# Patient Record
(demographics unavailable — no encounter records)

---

## 2024-10-25 NOTE — HISTORY OF PRESENT ILLNESS
[FreeTextEntry1] :  BIJU AVENDAÑO 60 year old male presents for an initial evaluation of a right knee injury at work on June 26, 2024. Over the last few months, his knee has caused more pain. He works as a . The pain has not improved since onset. He localizes the pain around the medial aspect. He describes the pain as dull and sharp. He notes no mechanical symptoms. The pain is worse with long periods of standing. He has tried bracing which provided some support. Navigating stairs is difficult. He is currently in physical therapy. He has not had any injection therapy. He has also used ice that did not help. Past medical history of hyperlipidemia. He has had an ACL reconstruction in 1996 and a right shoulder ORIF. No known drug allergies.

## 2024-10-25 NOTE — ADDENDUM
[FreeTextEntry1] : This note was written by Pablo Perdue on 10/25/2024 acting as scribe for Dr. Rob Han M.D.  I, Dr. Rob Han, have read and attest that all the information, medical decision making and discharge instructions within are true and accurate.

## 2024-10-25 NOTE — PHYSICAL EXAM
[de-identified] :  General appearance: well nourished and hydrated, pleasant, alert and oriented x 3, cooperative. HEENT: Normocephalic, EOM intact, Nasal septum midline, Oral cavity clear, External auditory canal clear. Cardiovascular: no apparent abnormalities, no lower leg edema, no varicosities, pedal pulses are palpable. Lymphatics Lymph nodes: none palpated, Lymphedema: not present. Neurologic: sensation is normal, no muscle weakness in upper or lower extremities, patella tendon reflexes intact . Dermatologic no apparent skin lesions, moist, warm, no rash. Spine:cervical spine appears normal and moves freely, thoracic spine appears normal and moves freely, lumbosacral spine appears normal and moves freely. Gait: nonantalgic.   Left knee Inspection: no effusion or erythema. Wounds: healed medial incision. Alignment: normal. Palpation: no specific tenderness on palpation. ROM active (in degrees): 0-120  Ligamentous laxity: all ligaments appear stable,, negative ant. drawer test, negative post. drawer test, stable to varus stress test, stable to valgus stress test. negative Lachman's test, negative pivot shift test Meniscal Test: negative McMurrays, negative Vic. Patellofemoral Alignment Test: Q angle-, normal. Muscle Test: good quad strength. Leg examination: calf is soft and non-tender.   Right knee Inspection: trace or erythema. Wounds: healed midline incision. Alignment: normal. Palpation: medial tenderness on palpation. ROM active (in degrees): 0-100 with pain and crepitus  Ligamentous laxity: all ligaments appear stable,, negative ant. drawer test, negative post. drawer test, stable to varus stress test, stable to valgus stress test. negative Lachman's test, negative pivot shift test Meniscal Test: negative McMurrays, negative Vic. Patellofemoral Alignment Test: Q angle-, normal. Muscle Test: good quad strength. Leg examination: calf is soft and non-tender.   Left hip Inspection: No swelling or ecchymosis. Wounds: none. Palpation: non-tender. Stability: no instability. Strength: 5/5 all motor groups. ROM: no pain with FROM. Leg length: equal.   Right hip Inspection: No swelling or ecchymosis. Wounds: none. Palpation: non-tender. Stability: no instability. Strength: 5/5 all motor groups. ROM: no pain with FROM. Leg length: equal.   Left ankle Inspection: no erythema noted, no swelling noted. Palpation: no pain on palpation . ROM: FROM without crepitus. Muscle strength: 5/5. Stability: no instability noted.   Right ankle Inspection: no erythema noted, no swelling noted. ROM: FROM without crepitus. Palpation: no pain on palpation . Muscle strength: 5/5. Stability: no instability noted.   Left foot Inspection: color, texture and turgor are normal. ROM: full range of motion of all joints without pain or crepitus. Palpation: no tenderness. Stability: no instability noted.   Right foot Inspection: color, texture and turgor are normal. ROM: full range of motion of all joints without pain or crepitus. Palpation: no tenderness. Stability: no instability noted.   Left shoulder Inspection: no muscle asymmetry, no atrophy. Palpation: no tenderness noted, ACJ non-tender. ROM: full active ROM, full passive ROM. Strength testing): anterior deltoid, supraspinatus, infraspinatus, subscapularis all 5/5. Stability test: ant. apprehension negative, post. apprehension negative, relocation test negative. Impingement Test: negative NEER.   Right shoulder Inspection: no muscle asymmetry, no atrophy. Palpation: no tenderness noted, ACJ non-tender. ROM: full active ROM, full passive ROM. Strength testing): anterior deltoid, supraspinatus, infraspinatus, subscapularis all 5/5. Stability test: ant. apprehension negative, post. apprehension negative, relocation test negative. Impingement Test: negative NEER. Surgical Wounds: none.   Left elbow Inspection: negative swelling. Wounds: none. Palpation: non-tender. ROM: full ROM. Strength: 5/5 all groups. Stability: no instability. Mass: none.   Right elbow Inspection: negative swelling. Wounds: none. Palpation: non-tender. ROM: full ROM. Strength: 5/5 all groups. Stability: no instability. Mass: none.   Left wrist Inspection: negative swelling. Wound: none. Palpation (bone): no tenderness. ROM: full ROM. Strength: full , good.   Right wrist Inspection: negative swelling. Wound: none. Palpation (bone): no tenderness. ROM: full ROM. Strength: full , good.   Left hand Inspection: no skin changes, normal appearance. Wounds: none. Strength: full , able to make full fist. Sensation: light touch intact all fingers and thumb. Vascular: good capillary refill < 3 seconds, all fingers and thumb. Mass: none.   Right hand Inspection: no skin changes, normal appearance. Wounds: none. Strength: full , able to make full fist. Sensation: light touch intact all fingers and thumb. Vascular: good capillary refill < 3 seconds, all fingers and thumb. Mass: none. [de-identified] : Right knee x-rays, standing AP/Lateral and Merchant films, and 45-degree PA standing view, taken at the office today shows diffuse tricompartmental degenerative arthritis, evidence of ACL reconstruction, femoral screw noted, medial and lateral joint space narrowing especially medial compartment, marginal osteophytes, bone on bone sclerosis, patellofemoral joint space narrowing, peripheral osteophytes, Kellgren and Marvin grade 4.   Left knee x-ray merchant view taken at the office today demonstrates joint space narrowing, osteophytes, two staples noted in femur, and a well centered patella.

## 2024-10-25 NOTE — DISCUSSION/SUMMARY
[de-identified] : Discussed at length the nature of the patient's condition. Their right knee symptoms appear secondary to degenerative arthritis related to a work injury as noted above. I have discussed operative vs. non-operative treatment plans with the patient as well as the natural history of the condition. He has had a prior ACL reconstruction, so his screws may need to be removed before surgery. He also wishes to transfer care to us and will seek approval from worker's comp. Discussed at length the natural history of degenerative arthritis and reviewed non-operative and operative treatment. Prior non-operative treatment for more than 3 months by either myself or prior treating physicians, including NSAIDs, injection therapy, a structured exercise program or physiotherapy and weight management has not resolved the condition. Due to the pain and associated functional disability that impacts all appropriate activities of daily living, I recommend a RIGHT total knee replacement. A thorough discussion regarding the risks, benefits, convalescence and alternatives were reviewed. The risks can include but are not limited to anesthesia risk, bleeding wit possible transfusion, nerve injury, infection, revision surgery due to implant failure, bone fracture, deep vein thrombosis, cardiac failure, pneumonia, and respiratory distress. The patient's expectations were reviewed and compared to the surgeon's expectations. This allowed for a discussion regarding reasonable expectations for functional improvement, pain relief, and return to normal activities of daily living. Numerous questions were asked and answered to their satisfaction. The patient was involved in the decision-making process - we discussed implant choice and fixation along with all details of TKA. Models were used as an educational tool. Surgery will be scheduled at a convenient time.  Preop medical clearance.   If he does not receive approval from [employer], he will return in 6 weeks.

## 2024-12-09 NOTE — DISCUSSION/SUMMARY
[de-identified] : Discussed at length the natural history of degenerative arthritis and reviewed non-operative and operative treatment. Prior non-operative treatment for more than 3 months by either myself or prior treating physicians, including NSAIDs, injection therapy, a structured exercise program or physiotherapy and weight management has not resolved the condition. Due to the pain and associated functional disability that impacts all appropriate activities of daily living, I recommend a RIGHT total knee replacement. A thorough discussion regarding the risks, benefits, convalescence and alternatives were reviewed. The risks can include but are not limited to anesthesia risk, bleeding wit possible transfusion, nerve injury, infection, revision surgery due to implant failure, bone fracture, deep vein thrombosis, cardiac failure, pneumonia, and respiratory distress. The patient's expectations were reviewed and compared to the surgeon's expectations. This allowed for a discussion regarding reasonable expectations for functional improvement, pain relief, and return to normal activities of daily living. Numerous questions were asked and answered to their satisfaction. The patient was involved in the decision-making process - we discussed implant choice and fixation along with all details of TKA. Models were used as an educational tool. We did discuss their young age, level of post-op activity, mechanisms of failure and longevity of implants. I did explain that they are stiff with limited range of motion preoperatively, which will dictate their postoperative range of motion. He does have retained hardware from his prior ACL reconstruction. I did explain that if the screws interfere they will be removed.        Surgery is scheduled on February 4th, 2025       Preop medical clearance.

## 2024-12-09 NOTE — PHYSICAL EXAM
[de-identified] :  General appearance: well nourished and hydrated, pleasant, alert and oriented x 3, cooperative. HEENT: Normocephalic, EOM intact, Nasal septum midline, Oral cavity clear, External auditory canal clear. Cardiovascular: no apparent abnormalities, no lower leg edema, no varicosities, pedal pulses are palpable. Lymphatics Lymph nodes: none palpated, Lymphedema: not present. Neurologic: sensation is normal, no muscle weakness in upper or lower extremities, patella tendon reflexes intact . Dermatologic no apparent skin lesions, moist, warm, no rash. Spine:cervical spine appears normal and moves freely, thoracic spine appears normal and moves freely, lumbosacral spine appears normal and moves freely. Gait: nonantalgic.  Right knee Inspection: trace effusion. Wounds: healed midline incision. Alignment: normal. Palpation: medial and lateral tenderness on palpation. ROM active (in degrees): 0-90 with pain and crepitus  Ligamentous laxity: all ligaments appear stable,, negative ant. drawer test, negative post. drawer test, stable to varus stress test, stable to valgus stress test. negative Lachman's test, negative pivot shift test Meniscal Test: negative McMurrays, negative Vic. Patellofemoral Alignment Test: Q angle-, normal. Muscle Test: good quad strength. Leg examination: calf is soft and non-tender.   Left hip Inspection: No swelling or ecchymosis. Wounds: none. Palpation: non-tender. Stability: no instability. Strength: 5/5 all motor groups. ROM: no pain with FROM. Leg length: equal.   Right hip Inspection: No swelling or ecchymosis. Wounds: none. Palpation: non-tender. Stability: no instability. Strength: 5/5 all motor groups. ROM: no pain with FROM. Leg length: equal.   Left ankle Inspection: no erythema noted, no swelling noted. Palpation: no pain on palpation . ROM: FROM without crepitus. Muscle strength: 5/5. Stability: no instability noted.   Right ankle Inspection: no erythema noted, no swelling noted. ROM: FROM without crepitus. Palpation: no pain on palpation . Muscle strength: 5/5. Stability: no instability noted.   Left foot Inspection: color, texture and turgor are normal. ROM: full range of motion of all joints without pain or crepitus. Palpation: no tenderness. Stability: no instability noted.   Right foot Inspection: color, texture and turgor are normal. ROM: full range of motion of all joints without pain or crepitus. Palpation: no tenderness. Stability: no instability noted.   Left shoulder Inspection: no muscle asymmetry, no atrophy. Palpation: no tenderness noted, ACJ non-tender. ROM: full active ROM, full passive ROM. Strength testing): anterior deltoid, supraspinatus, infraspinatus, subscapularis all 5/5. Stability test: ant. apprehension negative, post. apprehension negative, relocation test negative. Impingement Test: negative NEER.   Right shoulder Inspection: no muscle asymmetry, no atrophy. Palpation: no tenderness noted, ACJ non-tender. ROM: full active ROM, full passive ROM. Strength testing): anterior deltoid, supraspinatus, infraspinatus, subscapularis all 5/5. Stability test: ant. apprehension negative, post. apprehension negative, relocation test negative. Impingement Test: negative NEER. Surgical Wounds: none.   Left elbow Inspection: negative swelling. Wounds: none. Palpation: non-tender. ROM: full ROM. Strength: 5/5 all groups. Stability: no instability. Mass: none.   Right elbow Inspection: negative swelling. Wounds: none. Palpation: non-tender. ROM: full ROM. Strength: 5/5 all groups. Stability: no instability. Mass: none.   Left wrist Inspection: negative swelling. Wound: none. Palpation (bone): no tenderness. ROM: full ROM. Strength: full , good.   Right wrist Inspection: negative swelling. Wound: none. Palpation (bone): no tenderness. ROM: full ROM. Strength: full , good.   Left hand Inspection: no skin changes, normal appearance. Wounds: none. Strength: full , able to make full fist. Sensation: light touch intact all fingers and thumb. Vascular: good capillary refill < 3 seconds, all fingers and thumb. Mass: none.   Right hand Inspection: no skin changes, normal appearance. Wounds: none. Strength: full , able to make full fist. Sensation: light touch intact all fingers and thumb. Vascular: good capillary refill < 3 seconds, all fingers and thumb. Mass: none. [de-identified] : No x-rays taken today.

## 2024-12-09 NOTE — REASON FOR VISIT
[Procedure Visit] : a procedure visit for [Workers' Comp: Date of Injury: _____] : This visit is related to worker's compensation. Date of Injury: [unfilled] [Knee Pain] : knee pain

## 2024-12-09 NOTE — PHYSICAL EXAM
[de-identified] :  General appearance: well nourished and hydrated, pleasant, alert and oriented x 3, cooperative. HEENT: Normocephalic, EOM intact, Nasal septum midline, Oral cavity clear, External auditory canal clear. Cardiovascular: no apparent abnormalities, no lower leg edema, no varicosities, pedal pulses are palpable. Lymphatics Lymph nodes: none palpated, Lymphedema: not present. Neurologic: sensation is normal, no muscle weakness in upper or lower extremities, patella tendon reflexes intact . Dermatologic no apparent skin lesions, moist, warm, no rash. Spine:cervical spine appears normal and moves freely, thoracic spine appears normal and moves freely, lumbosacral spine appears normal and moves freely. Gait: nonantalgic.  Right knee Inspection: trace effusion. Wounds: healed midline incision. Alignment: normal. Palpation: medial and lateral tenderness on palpation. ROM active (in degrees): 0-90 with pain and crepitus  Ligamentous laxity: all ligaments appear stable,, negative ant. drawer test, negative post. drawer test, stable to varus stress test, stable to valgus stress test. negative Lachman's test, negative pivot shift test Meniscal Test: negative McMurrays, negative Vic. Patellofemoral Alignment Test: Q angle-, normal. Muscle Test: good quad strength. Leg examination: calf is soft and non-tender.   Left hip Inspection: No swelling or ecchymosis. Wounds: none. Palpation: non-tender. Stability: no instability. Strength: 5/5 all motor groups. ROM: no pain with FROM. Leg length: equal.   Right hip Inspection: No swelling or ecchymosis. Wounds: none. Palpation: non-tender. Stability: no instability. Strength: 5/5 all motor groups. ROM: no pain with FROM. Leg length: equal.   Left ankle Inspection: no erythema noted, no swelling noted. Palpation: no pain on palpation . ROM: FROM without crepitus. Muscle strength: 5/5. Stability: no instability noted.   Right ankle Inspection: no erythema noted, no swelling noted. ROM: FROM without crepitus. Palpation: no pain on palpation . Muscle strength: 5/5. Stability: no instability noted.   Left foot Inspection: color, texture and turgor are normal. ROM: full range of motion of all joints without pain or crepitus. Palpation: no tenderness. Stability: no instability noted.   Right foot Inspection: color, texture and turgor are normal. ROM: full range of motion of all joints without pain or crepitus. Palpation: no tenderness. Stability: no instability noted.   Left shoulder Inspection: no muscle asymmetry, no atrophy. Palpation: no tenderness noted, ACJ non-tender. ROM: full active ROM, full passive ROM. Strength testing): anterior deltoid, supraspinatus, infraspinatus, subscapularis all 5/5. Stability test: ant. apprehension negative, post. apprehension negative, relocation test negative. Impingement Test: negative NEER.   Right shoulder Inspection: no muscle asymmetry, no atrophy. Palpation: no tenderness noted, ACJ non-tender. ROM: full active ROM, full passive ROM. Strength testing): anterior deltoid, supraspinatus, infraspinatus, subscapularis all 5/5. Stability test: ant. apprehension negative, post. apprehension negative, relocation test negative. Impingement Test: negative NEER. Surgical Wounds: none.   Left elbow Inspection: negative swelling. Wounds: none. Palpation: non-tender. ROM: full ROM. Strength: 5/5 all groups. Stability: no instability. Mass: none.   Right elbow Inspection: negative swelling. Wounds: none. Palpation: non-tender. ROM: full ROM. Strength: 5/5 all groups. Stability: no instability. Mass: none.   Left wrist Inspection: negative swelling. Wound: none. Palpation (bone): no tenderness. ROM: full ROM. Strength: full , good.   Right wrist Inspection: negative swelling. Wound: none. Palpation (bone): no tenderness. ROM: full ROM. Strength: full , good.   Left hand Inspection: no skin changes, normal appearance. Wounds: none. Strength: full , able to make full fist. Sensation: light touch intact all fingers and thumb. Vascular: good capillary refill < 3 seconds, all fingers and thumb. Mass: none.   Right hand Inspection: no skin changes, normal appearance. Wounds: none. Strength: full , able to make full fist. Sensation: light touch intact all fingers and thumb. Vascular: good capillary refill < 3 seconds, all fingers and thumb. Mass: none. [de-identified] : No x-rays taken today.

## 2024-12-09 NOTE — HISTORY OF PRESENT ILLNESS
[de-identified] : BIJU AVENDAÑO 60 year old male presents for evaluation of right knee OA. He received authorization from  for a right TKR which he is scheduled on February 4, 2025. He only has HLD and will need medical clearance. He has no questions today.

## 2024-12-09 NOTE — HISTORY OF PRESENT ILLNESS
[de-identified] : BIJU AVENDAÑO 60 year old male presents for evaluation of right knee OA. He received authorization from  for a right TKR which he is scheduled on February 4, 2025. He only has HLD and will need medical clearance. He has no questions today.

## 2024-12-09 NOTE — ADDENDUM
[FreeTextEntry1] : This note was written by Heriberto Davalos on 12/09/2024 acting as scribe for Dr. Rob Han M.D.   I, Dr. Rob Han, have read and attest that all the information, medical decision making and discharge instructions within are true and accurate.

## 2024-12-09 NOTE — DISCUSSION/SUMMARY
[de-identified] : Discussed at length the natural history of degenerative arthritis and reviewed non-operative and operative treatment. Prior non-operative treatment for more than 3 months by either myself or prior treating physicians, including NSAIDs, injection therapy, a structured exercise program or physiotherapy and weight management has not resolved the condition. Due to the pain and associated functional disability that impacts all appropriate activities of daily living, I recommend a RIGHT total knee replacement. A thorough discussion regarding the risks, benefits, convalescence and alternatives were reviewed. The risks can include but are not limited to anesthesia risk, bleeding wit possible transfusion, nerve injury, infection, revision surgery due to implant failure, bone fracture, deep vein thrombosis, cardiac failure, pneumonia, and respiratory distress. The patient's expectations were reviewed and compared to the surgeon's expectations. This allowed for a discussion regarding reasonable expectations for functional improvement, pain relief, and return to normal activities of daily living. Numerous questions were asked and answered to their satisfaction. The patient was involved in the decision-making process - we discussed implant choice and fixation along with all details of TKA. Models were used as an educational tool. We did discuss their young age, level of post-op activity, mechanisms of failure and longevity of implants. I did explain that they are stiff with limited range of motion preoperatively, which will dictate their postoperative range of motion. He does have retained hardware from his prior ACL reconstruction. I did explain that if the screws interfere they will be removed.        Surgery is scheduled on February 4th, 2025       Preop medical clearance.

## 2024-12-20 NOTE — HISTORY OF PRESENT ILLNESS
[Not working due to injury] : Work status: not working due to injury [] : This patient has had an injection before: yes [de-identified] : 12/20/24: 59 y/o M presents for WCFU eval today. Pt reports no significant changes.   9/17/24 pt is here for WCFU of his right hip today. States no changes since last visit. PT 2x a week.  6/25/24: 59 y/o M presents for WCFU eval of the Rt. hip today. Pt reports pain levels remain the same, reports pain on a daily basis and pain with weightbearing.  He is a significant mount of pain at work and with prolonged periods of weightbearing in general.  Takes Aspirin as needed. Currently awaiting WC auth. for sx.   5/17/24 patient is here for WCFU of his right hip today.   No change in symptoms, needs to go back to work where he will lose his job.  4/2/24: BIJU AVENDAÑO is a 59-year male here with right hip pain.  Pain has been present for 2 years.   Injury - yes WC 11/11/22 Mechanical symptoms - yes Exacerbating factors/activities/positions - yes Pain during or after activity - yes Back pain - yes Radicular pain - yes   Previous Treatment: NSAIDs: no PT: yes Activity Mods: yes Surgery: no   Injections: yes Date of last injection: [03/21/24] Numbing phase relief: no Steroid phase relief: no     Occupation: [employer] Sports/Recreational Activities: [ ]   [de-identified] : lucy

## 2024-12-20 NOTE — DISCUSSION/SUMMARY
[de-identified] : Return to work paperwork filled out Declined PT/injection today NSAIDs PRN f/u 1-2 months after knee replacement to re-discuss hip All questions answered, agreeable with plan

## 2024-12-20 NOTE — DATA REVIEWED
[MRI] : MRI [Outside X-rays] : outside x-rays [Bilateral] : of the bilateral [Hip] : hip [I independently reviewed and interpreted images and report] : I independently reviewed and interpreted images and report [FreeTextEntry1] : Moderate to severe cartilage wear in the right hip several areas of full-thickness defects about the acetabulum and femoral head.  Anterior superior labral tear. [FreeTextEntry2] : Tonus grade 2 arthritis bilaterally

## 2024-12-20 NOTE — WORK
[Torn Ligament/Tendon/Muscle] : torn ligament, tendon or muscle [Was the competent medical cause of the injury] : was the competent medical cause of the injury [Are consistent with the injury] : are consistent with the injury [Consistent with my objective findings] : consistent with my objective findings [Total (100%)] : total (100%) [Does not reveal pre-existing condition(s) that may affect treatment/prognosis] : does not reveal pre-existing condition(s) that may affect treatment/prognosis [Can return to work without limitations on ______] : can return to work without limitations on [unfilled] [Patient] : patient [No Rx restrictions] : No Rx restrictions. [I provided the services listed above] :  I provided the services listed above. [FreeTextEntry1] : Good

## 2024-12-20 NOTE — IMAGING
[de-identified] : General: NAD, A&Ox3 Gait: Mildly antalgic, no Trendelenburg Foot Progression: neutral Knee Progression: neutral   B/L Hip Exam: Pain with Log Roll - negative Flexion: 100 Pain with Hyperflexion - no FADIR - pos KEIKO - pos Extension: 0 Flexion Contracture - none Prone Apprehension Test - neg Ischial tenderness - none Trochanteric tenderness - none   Abduction - 4+ /5, pain - yes Adduction - 5 /5 Supine resisted SLR - 5 /5, pain - yes Dorsiflexion 5/5 Plantarflexion 5/5 EHL 5/5 DP/PT 2+, foot is warm and well perfused      Leg Lengths: symmetric

## 2025-03-28 NOTE — HISTORY OF PRESENT ILLNESS
[Clean/Dry/Intact] : clean, dry and intact [Healed] : healed [Swelling] : swollen [Neuro Intact] : an unremarkable neurological exam [Vascular Intact] : ~T peripheral vascular exam normal [Negative Darrel's] : maneuvers demonstrated a negative Darrel's sign [Doing Well] : is doing well [No Sign of Infection] : is showing no signs of infection [Adequate Pain Control] : has adequate pain control [Staples Removed] : staples were removed [Chills] : no chills [Constipation] : no constipation [Diarrhea] : no diarrhea [Dysuria] : no dysuria [Fever] : no fever [Nausea] : no nausea [Vomiting] : no vomiting [Erythema] : not erythematous [Discharge] : absent of discharge [Dehiscence] : not dehisced [de-identified] : Patient presents today for F/U S/P Right TKR done 3 weeks ago. Patient is doing well and undergoing P.T. with improvement. Takes pain meds and DVT prophylaxis. I went over post-op care and answered all questions. I also provided patient with surgical report for their records. [de-identified] : ROM 5-40 degrees [de-identified] : Continue P.T. pain management and DVT prophylaxis. F/U in 2 weeks with x-rays. Provided extensive counseling on targeting ROM, pain control prior to therapy, and home exercises. Updated PT prescription provided for increased frequency.

## 2025-03-28 NOTE — PROCEDURE
[de-identified] : Observation on incision dry, clean, intact, well healed. Method staple removing kit. Suture site Cleaned with iodine swab after sutures are completely removed. Instructions Keep incision dry and clean, allowed to shower and pat site dry, do not rub dry, contact office is site becomes red, swollen, infected, or you develop a fever.

## 2025-04-11 NOTE — DISCUSSION/SUMMARY
[de-identified] : In regard to the patient's right TKR, he is very stiff. He was also very stiff pre-operatively. I informed him that he needs to work harder with his physical therapist to get his range of motion to at least 90 degrees.   Patient can continue home exercises, Tylenol, weight management and activities as tolerated. All questions were answered, understanding verbalized. Patient is in agreement with plan of treatment. Patient may follow up in 3 weeks for range of motion check and x-rays. If he does not gain his motion at that time, then he will require a manipulation. We will seek approval for the manipulation from worker's comp. In the interim, he will continue with physical therapy.

## 2025-04-11 NOTE — HISTORY OF PRESENT ILLNESS
[de-identified] :  BIJU AVENDAÑO 60 year old male presents for follow up evaluation of 5 weeks s/p right TKR. He has been going to pt. He will start going 5 days per week as he has a lot of stiffness. He was 0-90 pre-operatively. He has swelling after physical therapy but after taking Oxy, it helps. He is happy with his progress but will work hard with therapy to improve his range of motion.

## 2025-04-11 NOTE — PHYSICAL EXAM
[de-identified] :  General appearance: well nourished and hydrated, pleasant, alert and oriented x 3, cooperative. HEENT: Normocephalic, EOM intact, Nasal septum midline, Oral cavity clear, External auditory canal clear. Cardiovascular: no apparent abnormalities, no lower leg edema, no varicosities, pedal pulses are palpable. Lymphatics Lymph nodes: none palpated, Lymphedema: not present. Neurologic: sensation is normal, no muscle weakness in upper or lower extremities, patella tendon reflexes intact . Dermatologic no apparent skin lesions, moist, warm, no rash. Spine:cervical spine appears normal and moves freely, thoracic spine appears normal and moves freely, lumbosacral spine appears normal and moves freely. Gait: nonantalgic.  Right knee Inspection: no effusion. Mild soft tissue swelling  Wounds: healed midline incision. Alignment: normal. Palpation: medial and lateral tenderness on palpation. ROM active (in degrees): 0-40 Ligamentous laxity: all ligaments appear stable,, negative ant. drawer test, negative post. drawer test, stable to varus stress test, stable to valgus stress test. negative Lachman's test, negative pivot shift test Meniscal Test: negative McMurrays, negative Vic. Patellofemoral Alignment Test: Q angle-, normal. Muscle Test: good quad strength. Leg examination: calf is soft and non-tender. [de-identified] : Right knee x-ray, AP, lateral, merchant view taken at the office today demonstrates a total knee replacement in satisfactory position and alignment. No evidence of loosening. The patella sits in a central position. Prior ACL reconstruction and femoral screws noted.   Left knee x-ray merchant view taken at the office today demonstrates joint space narrowing, osteophytes, and a well centered patella.

## 2025-04-11 NOTE — ADDENDUM
[FreeTextEntry1] : This note was written by Pablo Perdue on 04/11/2025 acting as scribe for Dr. Rob Han M.D.  I, Dr. Rob Han, have read and attest that all the information, medical decision making and discharge instructions within are true and accurate.

## 2025-05-07 NOTE — DISCUSSION/SUMMARY
[de-identified] : Discussed the nature of the patient's condition with them. 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 At this point, he is not improving with PT; therefore, I have therefore recommended a RIGHT knee manipulation and will seek approval from . The risks include but are not limited to: femur fracture, tibia fracture, patella fracture, quad and patella tendon rupture, continued pain, swelling and limited ROM. The risks, benefits, convalescence and alternatives were reviewed. Numerous questions were asked and answered. Manipulation will be scheduled at a convenient time as soon as possible. In the interim, I suggested he continue with PT.   If we don't have approval from , I will see them back in 2-3 weeks.

## 2025-05-07 NOTE — ADDENDUM
[FreeTextEntry1] : This note was written by Heriberto Davalos on 05/07/2025 acting as scribe for Dr. Rob Han M.D.   I, Dr. Rob Han, have read and attest that all the information, medical decision making and discharge instructions within are true and accurate.

## 2025-05-07 NOTE — DISCUSSION/SUMMARY
[de-identified] : Discussed the nature of the patient's condition with them. 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 At this point, he is not improving with PT; therefore, I have therefore recommended a RIGHT knee manipulation and will seek approval from . The risks include but are not limited to: femur fracture, tibia fracture, patella fracture, quad and patella tendon rupture, continued pain, swelling and limited ROM. The risks, benefits, convalescence and alternatives were reviewed. Numerous questions were asked and answered. Manipulation will be scheduled at a convenient time as soon as possible. In the interim, I suggested he continue with PT.   If we don't have approval from , I will see them back in 2-3 weeks.

## 2025-05-09 NOTE — PHYSICAL EXAM
[de-identified] : Right knee x-ray, AP, lateral, merchant view taken at the office today demonstrates a total knee replacement in satisfactory position and alignment. No evidence of loosening. The patella sits in a central position. Femoral screw noted from prior ACL reconstruction.   Left knee x-ray merchant view taken at the office today demonstrates joint space narrowing, small osteophytes and a well centered patella. 2 metallic staples noted in lower leg in the femur.  [de-identified] : General appearance: well-nourished and hydrated, pleasant, alert and oriented x 3, cooperative. HEENT: Normocephalic, EOM intact, Nasal septum midline, Oral cavity clear, External auditory canal clear. Cardiovascular: no apparent abnormalities, no lower leg edema, no varicosities, pedal pulses are palpable. Lymphatics Lymph nodes: none palpated, Lymphedema: not present. Neurologic: sensation is normal, no muscle weakness in upper or lower extremities, patella tendon reflexes intact. Dermatologic no apparent skin lesions, moist, warm, no rash. Spine: cervical spine appears normal and moves freely, thoracic spine appears normal and moves freely, lumbosacral spine appears normal and moves freely. Gait: nonantalgic.   Right Knee Inspection: no effusion Wounds: healed midline incision  Alignment: normal. Palpation: no specific tenderness on palpation. ROM: Active (in degrees): Ligamentous laxity (neg): negative ant. drawer test, negative post. drawer test, stable to varus stress test, stable to valgus stress test, Patellofemoral Alignment Test: Q angle-, normal. Muscle Test: good quad strength. Leg examination: calf is soft and non-tender.

## 2025-05-09 NOTE — PHYSICAL EXAM
[de-identified] : Right knee x-ray, AP, lateral, merchant view taken at the office today demonstrates a total knee replacement in satisfactory position and alignment. No evidence of loosening. The patella sits in a central position. Femoral screw noted from prior ACL reconstruction.   Left knee x-ray merchant view taken at the office today demonstrates joint space narrowing, small osteophytes and a well centered patella. 2 metallic staples noted in lower leg in the femur.  [de-identified] : General appearance: well-nourished and hydrated, pleasant, alert and oriented x 3, cooperative. HEENT: Normocephalic, EOM intact, Nasal septum midline, Oral cavity clear, External auditory canal clear. Cardiovascular: no apparent abnormalities, no lower leg edema, no varicosities, pedal pulses are palpable. Lymphatics Lymph nodes: none palpated, Lymphedema: not present. Neurologic: sensation is normal, no muscle weakness in upper or lower extremities, patella tendon reflexes intact. Dermatologic no apparent skin lesions, moist, warm, no rash. Spine: cervical spine appears normal and moves freely, thoracic spine appears normal and moves freely, lumbosacral spine appears normal and moves freely. Gait: nonantalgic.   Right Knee Inspection: no effusion Wounds: healed midline incision  Alignment: normal. Palpation: no specific tenderness on palpation. ROM: Active (in degrees): Ligamentous laxity (neg): negative ant. drawer test, negative post. drawer test, stable to varus stress test, stable to valgus stress test, Patellofemoral Alignment Test: Q angle-, normal. Muscle Test: good quad strength. Leg examination: calf is soft and non-tender.

## 2025-05-30 NOTE — ADDENDUM
[FreeTextEntry1] : This note was written by Pablo Perdue on 05/30/2025 acting as scribe for Dr. Rob Han M.D.  I, Dr. Rob Han, have read and attest that all the information, medical decision making and discharge instructions within are true and accurate.

## 2025-05-30 NOTE — DISCUSSION/SUMMARY
[de-identified] : In regard to his right TKR, the patient has now completed 12 weeks of physical therapy. He is having great difficulty with his range of motion and is getting progressively worse. It has become a functional disability impacting the quality of his life on a daily basis. Due to lack of improvement, I think it is urgent we get approval from workers comp for right knee manipulation. Following the manipulation, he should have [employer] moe cycle to maintain range of motion we hope to achieve with the manipulation. If we do not have approval, the patient will return in 6 weeks to continue to monitor is progress.

## 2025-05-30 NOTE — HISTORY OF PRESENT ILLNESS
[de-identified] :  BIJU AVENDAÑO 60 year old male presents for follow up evaluation of 3 months s/p right TKR. He continues to attend physical therapy. His last session was on Monday. He still has a limited range of motion. He was also experiencing BL hip pain. He returned to work. He does take Oxy Codon and Tylenol for pain relief. He also notes weakness and stiffness in the thighs. We have bene trying to seek approval from workers comp for a manipulation but was denied. Worker's comp stated that patient must complete 12 weeks of physical therapy before they could approve a manipulation. The patient has now completed 12 weeks of pt and we are still trying to seek approval for a manipulation.

## 2025-07-16 NOTE — PHYSICAL EXAM
[de-identified] : General appearance: well-nourished and hydrated, pleasant, alert and oriented x 3, cooperative. HEENT: Normocephalic, EOM intact, Nasal septum midline, Oral cavity clear, External auditory canal clear. Cardiovascular: no apparent abnormalities, no lower leg edema, no varicosities, pedal pulses are palpable. Lymphatics Lymph nodes: none palpated, Lymphedema: not present. Neurologic: sensation is normal, no muscle weakness in upper or lower extremities, patella tendon reflexes intact. Dermatologic no apparent skin lesions, moist, warm, no rash. Spine: cervical spine appears normal and moves freely, thoracic spine appears normal and moves freely, lumbosacral spine appears normal and moves freely. Gait: nonantalgic.   Right Knee Inspection: no effusion Wounds: healed midline incision  Alignment: normal. Palpation: no specific tenderness on palpation. ROM: Active (in degrees): Ligamentous laxity (neg): negative ant. drawer test, negative post. drawer test, stable to varus stress test, stable to valgus stress test, Patellofemoral Alignment Test: Q angle-, normal. Muscle Test: good quad strength. Leg examination: calf is soft and non-tender.  [de-identified] : Right knee x-ray, AP, lateral, merchant view taken at the office today demonstrates a total knee replacement in satisfactory position and alignment. No evidence of loosening. The patella sits in a central position  Left knee x-ray merchant view taken at the office today demonstrates good joint space and a well centered patella.

## 2025-07-16 NOTE — PHYSICAL EXAM
[de-identified] : General appearance: well-nourished and hydrated, pleasant, alert and oriented x 3, cooperative. HEENT: Normocephalic, EOM intact, Nasal septum midline, Oral cavity clear, External auditory canal clear. Cardiovascular: no apparent abnormalities, no lower leg edema, no varicosities, pedal pulses are palpable. Lymphatics Lymph nodes: none palpated, Lymphedema: not present. Neurologic: sensation is normal, no muscle weakness in upper or lower extremities, patella tendon reflexes intact. Dermatologic no apparent skin lesions, moist, warm, no rash. Spine: cervical spine appears normal and moves freely, thoracic spine appears normal and moves freely, lumbosacral spine appears normal and moves freely. Gait: nonantalgic.   Right Knee Inspection: no effusion Wounds: healed midline incision  Alignment: normal. Palpation: no specific tenderness on palpation. ROM: Active (in degrees): Ligamentous laxity (neg): negative ant. drawer test, negative post. drawer test, stable to varus stress test, stable to valgus stress test, Patellofemoral Alignment Test: Q angle-, normal. Muscle Test: good quad strength. Leg examination: calf is soft and non-tender.  [de-identified] : Right knee x-ray, AP, lateral, merchant view taken at the office today demonstrates a total knee replacement in satisfactory position and alignment. No evidence of loosening. The patella sits in a central position  Left knee x-ray merchant view taken at the office today demonstrates good joint space and a well centered patella.